# Patient Record
Sex: MALE | Race: OTHER | HISPANIC OR LATINO | Employment: FULL TIME | URBAN - METROPOLITAN AREA
[De-identification: names, ages, dates, MRNs, and addresses within clinical notes are randomized per-mention and may not be internally consistent; named-entity substitution may affect disease eponyms.]

---

## 2018-06-23 ENCOUNTER — OFFICE VISIT (OUTPATIENT)
Dept: URGENT CARE | Facility: CLINIC | Age: 43
End: 2018-06-23

## 2018-06-23 ENCOUNTER — APPOINTMENT (OUTPATIENT)
Dept: RADIOLOGY | Facility: CLINIC | Age: 43
End: 2018-06-23

## 2018-06-23 VITALS
RESPIRATION RATE: 18 BRPM | DIASTOLIC BLOOD PRESSURE: 82 MMHG | TEMPERATURE: 99.8 F | WEIGHT: 178 LBS | BODY MASS INDEX: 30.39 KG/M2 | SYSTOLIC BLOOD PRESSURE: 146 MMHG | HEART RATE: 64 BPM | HEIGHT: 64 IN | OXYGEN SATURATION: 98 %

## 2018-06-23 DIAGNOSIS — M79.645 FINGER PAIN, LEFT: Primary | ICD-10-CM

## 2018-06-23 DIAGNOSIS — M79.645 FINGER PAIN, LEFT: ICD-10-CM

## 2018-06-23 DIAGNOSIS — M20.012 MALLET DEFORMITY OF LEFT LITTLE FINGER: ICD-10-CM

## 2018-06-23 PROCEDURE — 73140 X-RAY EXAM OF FINGER(S): CPT

## 2018-06-23 NOTE — PROGRESS NOTES
[facility]        NAME: Amrit Espinosa is a 44 y o  male  : 1975    MRN: [id number]  DATE: 2018  TIME: 3:09 PM    Assessment and Plan   Finger pain, left [M79 [id number]]  1  Finger pain, left  XR finger left fifth digit-pinkie         Patient Instructions     There are no Patient Instructions on file for this visit  Follow up with PCP in 3-5 days  Proceed to  ER if symptoms worsen  Chief Complaint     Chief Complaint   Patient presents with    Hand Pain     left 5th finger caught in string of hay bale now distal part of finger unable to straighten  happened 10 days ago  History of Present Illness       Patient complains of pain and deformity at the left 5th finger since work related injury 10 days ago  Review of Systems   Review of Systems   Constitutional: Negative for chills and fever  Musculoskeletal: Positive for arthralgias  Negative for joint swelling  Skin: Negative for color change, rash and wound  Neurological: Negative for numbness  Current Medications     No current outpatient prescriptions on file  Current Allergies     Allergies as of 2018    (No Known Allergies)            The following portions of the patient's history were reviewed and updated as appropriate: allergies, current medications, past family history, past medical history, past social history, past surgical history and problem list      History reviewed  No pertinent past medical history  History reviewed  No pertinent surgical history  No family history on file  Medications have been verified  Objective   /82   Pulse 64   Temp 99 8 °F (37 7 °C) (Tympanic)   Resp 18   Ht 5' 4" (1 626 m)   Wt 80 7 kg (178 lb)   SpO2 98%   BMI 30 55 kg/m²        Physical Exam     Physical Exam   Constitutional: He is oriented to person, place, and time  He appears well-developed and well-nourished  No distress  Neck: Neck supple     Cardiovascular: Normal rate and regular rhythm  Pulmonary/Chest: Effort normal and breath sounds normal    Musculoskeletal: He exhibits tenderness and deformity  Neurological: He is alert and oriented to person, place, and time  Skin: Skin is warm and dry  No rash noted  Nursing note and vitals reviewed

## 2018-06-23 NOTE — PATIENT INSTRUCTIONS
Dedo en shonda   LO QUE NECESITA SABER:   El dedo en shonda es jose lesión del tendón que endereza la punta de galicia dedo  Un pedazo de hueso podría ser arrancado con galicia tendón  Es posible que tome de 4 a 8 semanas para que galicia lesión sane  INSTRUCCIONES SOBRE EL JUNIOR HOSPITALARIA:   Medicamentos:  Es posible que usted necesite alguno de los siguientes:  · El acetaminofén  paty el dolor  Está disponible sin receta médica  Pregunte la cantidad y la frecuencia con que debe tomarlos  [address]  El acetaminofén puede causar daño en el hígado cuando no se serafin de forma correcta  · AINEs (Analgésicos antiinflamatorios no esteroides) sarath el ibuprofeno, ayudan a disminuir la inflamación, el dolor y la [place]  [place] medicamento esta disponible con o sin jose receta médica  Los AINEs pueden causar sangrado estomacal o problemas renales en ciertas personas  Si usted esta tomando un anticoágulante,  siempre  pregunte si los AINEs son seguros para usted  Siempre rivera la etiqueta de ka[place] medicamento y [place] instrucciones  No administre kaiden medicamento a niños menores de 6 meses de deana sin antes obtener la autorización de galicia médico      · [place] magda medicamentos sarath se le haya indicado  Consulte con galicia médico si usted baldomero que galicia medicamento no le está ayudando o si presenta efectos secundarios  Infórmele si es alérgico a cualquier medicamento  Mantenga jose lista actualizada de los [place], las vitaminas y los productos herbales que serafin  Incluya los siguientes datos de los medicamentos: cantidad, frecuencia y motivo de administración  Traiga con usted la lista o los envases de la píldoras a magda citas de seguimiento  Lleve la lista de los medicamentos con usted en chuck de jose emergencia  Cuidados personales:   · Aplique hielo  en el dedo de 15 a 20 minutos cada hora o sarath se le indique  Use un paquete de hielo o ponga hielo molido dentro de [employer]  [place] con jose toalla   El hielo ayuda a evitar daño al tejido y a disminuir la inflamación y el dolor  · Eleve  galicia mano por encima del nivel de galicia corazón tanto sarath pueda  [place] va a disminuir inflamación y el dolor  Coloque galicia mano sobre almohadas o cobijas para mantenerlas elevadas cómodamente  · Inmovilice  galicia dedo  Le pondrán jose férula en galicia dedo para mantenerlo derecho [employer]  Es probable que necesite usar la férula lilly 6 a 8 semanas  Es posible que usted necesite continuar usando la férula lilly actividades deportivas por otras 6 a 8 semanas  Cuidado de la férula:   · Usted puede quitarse la férula diariamente para lavarse el dedo  · Cuando no tenga puesta la férula, no intente doblar la punta de galicia dedo  · Póngase la férula lo más pronto posible  Cuando usted ponga más cinta adhesiva, asegúrese que la férula esté en el mismo lugar y posición  Usted también podría necesitar cinta adhesiva nueva si se moja la férula  Si galicia dedo está adormecido u hormiguea, es posible que la cinta esté [place]  Afloje la cinta para que galicia dedo se sienta cómodo  Pregúntele a galicia Martita Ernie vitaminas y minerales son adecuados para usted  · Usted tiene dolor o inflamación que KWABENA  · Hay un aumento en el enrojecimiento e inflamación de galicia dedo  · Galicia dedo se siente adormecido, con hormigueo o frío, aún después de aflojar galicia férula  · Galicia dedo se ve Rozella Lay y se siente frío  · Usted tiene preguntas o inquietudes acerca de galicia condición o cuidado  Regrese a la leo de emergencias si:   · Usted tiene abundio debajo de galicia uña, o galicia uña no está completamente unida al dedo  © 2017 [facility] Information is for End User's use only and may not be sold, redistributed or otherwise used for commercial purposes  All illustrations and images included in CareNotes® are the copyrighted property of [employer]  or Tunde Anderson  Esta información es sólo para uso en educación   Galicia intención no es darle un consejo médico sobre enfermedades o tratamientos  Colsulte con galicia [place] farmacéutico antes de seguir cualquier régimen médico para saber si es seguro y efectivo para usted